# Patient Record
Sex: FEMALE | Race: WHITE | NOT HISPANIC OR LATINO | Employment: UNEMPLOYED | URBAN - METROPOLITAN AREA
[De-identification: names, ages, dates, MRNs, and addresses within clinical notes are randomized per-mention and may not be internally consistent; named-entity substitution may affect disease eponyms.]

---

## 2021-11-29 ENCOUNTER — TELEPHONE (OUTPATIENT)
Dept: FAMILY MEDICINE CLINIC | Facility: CLINIC | Age: 46
End: 2021-11-29

## 2021-11-29 ENCOUNTER — OFFICE VISIT (OUTPATIENT)
Dept: FAMILY MEDICINE CLINIC | Facility: CLINIC | Age: 46
End: 2021-11-29

## 2021-11-29 VITALS
SYSTOLIC BLOOD PRESSURE: 126 MMHG | RESPIRATION RATE: 16 BRPM | WEIGHT: 293 LBS | HEART RATE: 84 BPM | BODY MASS INDEX: 44.41 KG/M2 | DIASTOLIC BLOOD PRESSURE: 80 MMHG | HEIGHT: 68 IN | OXYGEN SATURATION: 94 %

## 2021-11-29 DIAGNOSIS — Z23 NEED FOR VACCINATION: ICD-10-CM

## 2021-11-29 DIAGNOSIS — Z12.11 SCREEN FOR COLON CANCER: Primary | ICD-10-CM

## 2021-11-29 DIAGNOSIS — Z00.00 ANNUAL PHYSICAL EXAM: ICD-10-CM

## 2021-11-29 DIAGNOSIS — Z11.59 NEED FOR HEPATITIS C SCREENING TEST: ICD-10-CM

## 2021-11-29 DIAGNOSIS — R63.5 WEIGHT GAIN: ICD-10-CM

## 2021-11-29 DIAGNOSIS — Z13.220 SCREENING FOR HYPERLIPIDEMIA: ICD-10-CM

## 2021-11-29 DIAGNOSIS — R60.0 PEDAL EDEMA: ICD-10-CM

## 2021-11-29 DIAGNOSIS — Z79.899 HIGH RISK MEDICATION USE: ICD-10-CM

## 2021-11-29 PROBLEM — J44.9 CHRONIC OBSTRUCTIVE PULMONARY DISEASE (HCC): Status: ACTIVE | Noted: 2021-11-29

## 2021-11-29 PROBLEM — I10 HYPERTENSION: Status: ACTIVE | Noted: 2021-11-29

## 2021-11-29 PROBLEM — G47.30 SLEEP APNEA: Status: ACTIVE | Noted: 2021-11-29

## 2021-11-29 PROBLEM — F43.10 POSTTRAUMATIC STRESS DISORDER: Status: ACTIVE | Noted: 2021-11-29

## 2021-11-29 PROCEDURE — 99203 OFFICE O/P NEW LOW 30 MIN: CPT | Performed by: NURSE PRACTITIONER

## 2021-11-29 PROCEDURE — 0001A COVID-19 (PFIZER): CPT | Performed by: NURSE PRACTITIONER

## 2021-11-29 PROCEDURE — 91300 COVID-19 (PFIZER): CPT | Performed by: NURSE PRACTITIONER

## 2021-11-29 RX ORDER — CARVEDILOL 6.25 MG/1
6.25 TABLET ORAL 2 TIMES DAILY WITH MEALS
COMMUNITY

## 2021-11-29 RX ORDER — HYDROCHLOROTHIAZIDE 25 MG/1
25 TABLET ORAL DAILY
COMMUNITY

## 2021-11-29 RX ORDER — FUROSEMIDE 20 MG/1
1 TABLET ORAL 2 TIMES DAILY
COMMUNITY
Start: 2021-10-20 | End: 2022-03-28

## 2021-11-29 RX ORDER — IPRATROPIUM BROMIDE AND ALBUTEROL SULFATE 2.5; .5 MG/3ML; MG/3ML
3 SOLUTION RESPIRATORY (INHALATION) EVERY 6 HOURS PRN
COMMUNITY
Start: 2021-09-18

## 2021-11-29 RX ORDER — MELOXICAM 15 MG/1
15 TABLET ORAL DAILY
COMMUNITY
End: 2022-03-28 | Stop reason: SDUPTHER

## 2021-11-29 RX ORDER — FUROSEMIDE 40 MG/1
40 TABLET ORAL 2 TIMES DAILY
COMMUNITY
End: 2021-11-29

## 2021-11-29 RX ORDER — ACETAMINOPHEN 500 MG
1 TABLET ORAL 3 TIMES DAILY PRN
COMMUNITY
Start: 2021-10-11

## 2021-11-29 RX ORDER — MAGNESIUM OXIDE 400 MG/1
1 TABLET ORAL DAILY
COMMUNITY
Start: 2021-09-07 | End: 2022-05-16

## 2021-11-29 RX ORDER — POTASSIUM CHLORIDE 750 MG/1
10 CAPSULE, EXTENDED RELEASE ORAL 2 TIMES DAILY
COMMUNITY

## 2021-11-29 RX ORDER — MONTELUKAST SODIUM 10 MG/1
1 TABLET ORAL NIGHTLY
COMMUNITY
Start: 2021-11-09

## 2021-11-29 NOTE — PROGRESS NOTES
Preventive Exam    History of Present Illness: Mirian Poon is a 46 y.o. here for check up and review of routine health maintenance. she states she is doing well and has concerns regarding pedal edema. She reports that this is an ongoing issue for about 3-4 months. Patient is taking lasix and HCTZ daily. She reports that the swelling is worse. Patient admits to shortness of breath with exertion. Her current BMI is 53.98 and she has COPD.  Patient's blood pressure medications were recently changed. Recent echocardiogram (7/2021) showed EF of 58% and mild mitral valve regurgitation.     Past medical history, surgical history and family history have been reviewed.     Review of Systems   Constitutional: Positive for fatigue and unexpected weight gain. Negative for appetite change, chills, fever and unexpected weight loss.   HENT: Negative for congestion, dental problem, postnasal drip, rhinorrhea, sinus pressure, sore throat and voice change.         Dental exam is due.    Eyes: Negative.  Negative for blurred vision, double vision and redness.        Eye exam is up to date. Wears glasses.    Respiratory: Positive for shortness of breath. Negative for cough, chest tightness and wheezing.    Cardiovascular: Positive for leg swelling. Negative for chest pain and palpitations.   Gastrointestinal: Negative for abdominal pain, constipation, diarrhea, nausea, rectal pain and vomiting.   Endocrine: Positive for polyuria. Negative for cold intolerance, heat intolerance, polydipsia and polyphagia.   Genitourinary: Positive for frequency. Negative for breast discharge, breast lump, breast pain, menstrual problem, pelvic pain, pelvic pressure, vaginal bleeding and vaginal discharge.   Musculoskeletal: Positive for arthralgias (Knees bilaterally) and back pain. Negative for joint swelling and myalgias.   Skin: Negative.    Allergic/Immunologic: Negative.  Negative for environmental allergies and food allergies.   Neurological:  Positive for numbness (Right finger numbness). Negative for dizziness, weakness and headache.   Hematological: Negative.  Does not bruise/bleed easily.   Psychiatric/Behavioral: Negative.  Positive for depressed mood. Negative for sleep disturbance and suicidal ideas. The patient is not nervous/anxious.         Patient is scheduled with psychiatrist. She denies any suicidal thoughts or planning.        PHYSICAL EXAM    Vitals:    11/29/21 1020   BP: 126/80   Pulse: 84   Resp: 16   SpO2: 94%     Body mass index is 53.98 kg/m².      Physical Exam  Vitals and nursing note reviewed.   Constitutional:       Appearance: Normal appearance. She is well-developed. She is obese.   HENT:      Head: Normocephalic and atraumatic.      Right Ear: Tympanic membrane, ear canal and external ear normal.      Left Ear: Tympanic membrane, ear canal and external ear normal.      Nose: Nose normal.      Mouth/Throat:      Lips: Pink.      Mouth: Mucous membranes are moist.      Tongue: No lesions.      Palate: No mass and lesions.      Pharynx: Oropharynx is clear. Uvula midline.      Tonsils: No tonsillar exudate.   Eyes:      Conjunctiva/sclera: Conjunctivae normal.      Pupils: Pupils are equal, round, and reactive to light.   Neck:      Thyroid: No thyromegaly.   Cardiovascular:      Rate and Rhythm: Normal rate and regular rhythm.      Pulses:           Dorsalis pedis pulses are 1+ on the right side and 1+ on the left side.        Posterior tibial pulses are 1+ on the right side and 1+ on the left side.      Heart sounds: Normal heart sounds. No murmur heard.      Pulmonary:      Effort: Pulmonary effort is normal.      Breath sounds: Normal breath sounds.   Chest:   Breasts:      Right: No supraclavicular adenopathy.      Left: No supraclavicular adenopathy.       Abdominal:      General: Bowel sounds are normal. There is no distension.      Palpations: Abdomen is soft.      Tenderness: There is no abdominal tenderness.    Musculoskeletal:         General: No deformity. Normal range of motion.      Cervical back: Normal range of motion and neck supple.      Right lower le+ Pitting Edema present.      Left lower le+ Pitting Edema present.   Lymphadenopathy:      Head:      Right side of head: No submental, submandibular, tonsillar, preauricular, posterior auricular or occipital adenopathy.      Left side of head: No submental, submandibular, tonsillar, preauricular, posterior auricular or occipital adenopathy.      Cervical: No cervical adenopathy.      Right cervical: No superficial, deep or posterior cervical adenopathy.     Left cervical: No superficial, deep or posterior cervical adenopathy.      Upper Body:      Right upper body: No supraclavicular adenopathy.      Left upper body: No supraclavicular adenopathy.   Skin:     General: Skin is warm and dry.      Capillary Refill: Capillary refill takes 2 to 3 seconds.   Neurological:      General: No focal deficit present.      Mental Status: She is alert and oriented to person, place, and time.      Cranial Nerves: Cranial nerves are intact. No cranial nerve deficit.      Sensory: Sensation is intact.      Motor: Motor function is intact.      Coordination: Coordination is intact.      Gait: Gait is intact.   Psychiatric:         Attention and Perception: Attention and perception normal.         Mood and Affect: Mood and affect normal.         Speech: Speech normal.         Behavior: Behavior normal. Behavior is cooperative.         Thought Content: Thought content normal.         Cognition and Memory: Cognition and memory normal.         Judgment: Judgment normal.         Procedures    Diagnoses and all orders for this visit:    1. Screen for colon cancer (Primary)  -     Cologuard - Stool, Per Rectum; Future    2. Annual physical exam    3. Screening for hyperlipidemia  -     Lipid Panel With / Chol / HDL Ratio    4. High risk medication use  -     CBC & Differential  -      Comprehensive Metabolic Panel    5. Need for hepatitis C screening test  -     Hepatitis C Antibody    6. Pedal edema  -     Comprehensive Metabolic Panel  -     BNP    7. Weight gain  -     TSH    8. Need for vaccination  -     COVID-19 Vaccine (Pfizer)        Problems Addressed this Visit     None      Visit Diagnoses     Screen for colon cancer    -  Primary    Relevant Orders    Cologuard - Stool, Per Rectum    Annual physical exam        Screening for hyperlipidemia        Relevant Orders    Lipid Panel With / Chol / HDL Ratio    High risk medication use        Relevant Orders    CBC & Differential    Comprehensive Metabolic Panel    Need for hepatitis C screening test        Relevant Orders    Hepatitis C Antibody    Pedal edema        Relevant Orders    Comprehensive Metabolic Panel    BNP    Weight gain        Relevant Orders    TSH    Need for vaccination        Relevant Orders    COVID-19 Vaccine (Pfizer)      Diagnoses       Codes Comments    Screen for colon cancer    -  Primary ICD-10-CM: Z12.11  ICD-9-CM: V76.51     Annual physical exam     ICD-10-CM: Z00.00  ICD-9-CM: V70.0     Screening for hyperlipidemia     ICD-10-CM: Z13.220  ICD-9-CM: V77.91     High risk medication use     ICD-10-CM: Z79.899  ICD-9-CM: V58.69     Need for hepatitis C screening test     ICD-10-CM: Z11.59  ICD-9-CM: V73.89     Pedal edema     ICD-10-CM: R60.0  ICD-9-CM: 782.3     Weight gain     ICD-10-CM: R63.5  ICD-9-CM: 783.1     Need for vaccination     ICD-10-CM: Z23  ICD-9-CM: V05.9         Encouraged weight loss  DASH Diet  CMP  CBC  Hepatitis C Screening  BNP  Covid booster  Cologuard  TSH    Patient screened positive for depression based on a PHQ-9 score of 22 on 11/29/2021. Follow-up recommendations include: patient has appointment with psychiatrist with [facility].    Routine health maintenance reviewed and discussed with Mirian Poon.    Preventative counseling regarding healthy diet and exercise.   Pt reports  that he wears a seatbelt regularly.    Return in about 6 months (around 5/29/2022) for Annual, Labs.

## 2021-11-29 NOTE — PATIENT INSTRUCTIONS
"Return in about 6 months (around 5/29/2022) for Annual, Labs.  Call with any questions or concerns.  I will call you with your lab results.   Please call with any questions or concerns.     DASH Eating Plan  DASH stands for Dietary Approaches to Stop Hypertension. The DASH eating plan is a healthy eating plan that has been shown to:  · Reduce high blood pressure (hypertension).  · Reduce your risk for type 2 diabetes, heart disease, and stroke.  · Help with weight loss.  What are tips for following this plan?  Reading food labels  · Check food labels for the amount of salt (sodium) per serving. Choose foods with less than 5 percent of the Daily Value of sodium. Generally, foods with less than 300 milligrams (mg) of sodium per serving fit into this eating plan.  · To find whole grains, look for the word \"whole\" as the first word in the ingredient list.  Shopping  · Buy products labeled as \"low-sodium\" or \"no salt added.\"  · Buy fresh foods. Avoid canned foods and pre-made or frozen meals.  Cooking  · Avoid adding salt when cooking. Use salt-free seasonings or herbs instead of table salt or sea salt. Check with your health care provider or pharmacist before using salt substitutes.  · Do not redding foods. Cook foods using healthy methods such as baking, boiling, grilling, roasting, and broiling instead.  · Cook with heart-healthy oils, such as olive, canola, avocado, soybean, or sunflower oil.  Meal planning    · Eat a balanced diet that includes:  ? 4 or more servings of fruits and 4 or more servings of vegetables each day. Try to fill one-half of your plate with fruits and vegetables.  ? 6-8 servings of whole grains each day.  ? Less than 6 oz (170 g) of lean meat, poultry, or fish each day. A 3-oz (85-g) serving of meat is about the same size as a deck of cards. One egg equals 1 oz (28 g).  ? 2-3 servings of low-fat dairy each day. One serving is 1 cup (237 mL).  ? 1 serving of nuts, seeds, or beans 5 times each " week.  ? 2-3 servings of heart-healthy fats. Healthy fats called omega-3 fatty acids are found in foods such as walnuts, flaxseeds, fortified milks, and eggs. These fats are also found in cold-water fish, such as sardines, salmon, and mackerel.  · Limit how much you eat of:  ? Canned or prepackaged foods.  ? Food that is high in trans fat, such as some fried foods.  ? Food that is high in saturated fat, such as fatty meat.  ? Desserts and other sweets, sugary drinks, and other foods with added sugar.  ? Full-fat dairy products.  · Do not salt foods before eating.  · Do not eat more than 4 egg yolks a week.  · Try to eat at least 2 vegetarian meals a week.  · Eat more home-cooked food and less restaurant, buffet, and fast food.    Lifestyle  · When eating at a restaurant, ask that your food be prepared with less salt or no salt, if possible.  · If you drink alcohol:  ? Limit how much you use to:  § 0-1 drink a day for women who are not pregnant.  § 0-2 drinks a day for men.  ? Be aware of how much alcohol is in your drink. In the [place] one drink equals one 12 oz bottle of beer (355 mL), one 5 oz glass of wine (148 mL), or one 1½ oz glass of hard liquor (44 mL).  General information  · Avoid eating more than 2,300 mg of salt a day. If you have hypertension, you may need to reduce your sodium intake to 1,500 mg a day.  · Work with your health care provider to maintain a healthy body weight or to lose weight. Ask what an ideal weight is for you.  · Get at least 30 minutes of exercise that causes your heart to beat faster (aerobic exercise) most days of the week. Activities may include walking, swimming, or biking.  · Work with your health care provider or dietitian to adjust your eating plan to your individual calorie needs.  What foods should I eat?  Fruits  All fresh, dried, or frozen fruit. Canned fruit in natural juice (without added sugar).  Vegetables  Fresh or frozen vegetables (raw, steamed, roasted, or  grilled). Low-sodium or reduced-sodium tomato and vegetable juice. Low-sodium or reduced-sodium tomato sauce and tomato paste. Low-sodium or reduced-sodium canned vegetables.  Grains  Whole-grain or whole-wheat bread. Whole-grain or whole-wheat pasta. Brown rice. Oatmeal. Quinoa. Bulgur. Whole-grain and low-sodium cereals. Concepcion bread. Low-fat, low-sodium crackers. Whole-wheat flour tortillas.  Meats and other proteins  Skinless chicken or turkey. Ground chicken or turkey. Pork with fat trimmed off. Fish and seafood. Egg whites. Dried beans, peas, or lentils. Unsalted nuts, nut butters, and seeds. Unsalted canned beans. Lean cuts of beef with fat trimmed off. Low-sodium, lean precooked or cured meat, such as sausages or meat loaves.  Dairy  Low-fat (1%) or fat-free (skim) milk. Reduced-fat, low-fat, or fat-free cheeses. Nonfat, low-sodium ricotta or cottage cheese. Low-fat or nonfat yogurt. Low-fat, low-sodium cheese.  Fats and oils  Soft margarine without trans fats. Vegetable oil. Reduced-fat, low-fat, or light mayonnaise and salad dressings (reduced-sodium). Canola, safflower, olive, avocado, soybean, and sunflower oils. Avocado.  Seasonings and condiments  Herbs. Spices. Seasoning mixes without salt.  Other foods  Unsalted popcorn and pretzels. Fat-free sweets.  The items listed above may not be a complete list of foods and beverages you can eat. Contact a dietitian for more information.  What foods should I avoid?  Fruits  Canned fruit in a light or heavy syrup. Fried fruit. Fruit in cream or butter sauce.  Vegetables  Creamed or fried vegetables. Vegetables in a cheese sauce. Regular canned vegetables (not low-sodium or reduced-sodium). Regular canned tomato sauce and paste (not low-sodium or reduced-sodium). Regular tomato and vegetable juice (not low-sodium or reduced-sodium). Pickles. Olives.  Grains  Baked goods made with fat, such as croissants, muffins, or some breads. Dry pasta or rice meal packs.  Meats  and other proteins  Fatty cuts of meat. Ribs. Fried meat. Bhatti. Bologna, salami, and other precooked or cured meats, such as sausages or meat loaves. Fat from the back of a pig (fatback). Bratwurst. Salted nuts and seeds. Canned beans with added salt. Canned or smoked fish. Whole eggs or egg yolks. Chicken or turkey with skin.  Dairy  Whole or 2% milk, cream, and half-and-half. Whole or full-fat cream cheese. Whole-fat or sweetened yogurt. Full-fat cheese. Nondairy creamers. Whipped toppings. Processed cheese and cheese spreads.  Fats and oils  Butter. Stick margarine. Lard. Shortening. Ghee. Bhatti fat. Tropical oils, such as coconut, palm kernel, or palm oil.  Seasonings and condiments  Onion salt, garlic salt, seasoned salt, table salt, and sea salt. Worcestershire sauce. Tartar sauce. Barbecue sauce. Teriyaki sauce. Soy sauce, including reduced-sodium. Steak sauce. Canned and packaged gravies. Fish sauce. Oyster sauce. Cocktail sauce. Store-bought horseradish. Ketchup. Mustard. Meat flavorings and tenderizers. Bouillon cubes. Hot sauces. Pre-made or packaged marinades. Pre-made or packaged taco seasonings. Relishes. Regular salad dressings.  Other foods  Salted popcorn and pretzels.  The items listed above may not be a complete list of foods and beverages you should avoid. Contact a dietitian for more information.  Where to find more information  · National Heart, Lung, and Blood [place]: www.nhlbi.nih.gov  · American Heart Association: www.heart.org  · Academy of Nutrition and Dietetics: www.eatright.org  · National Kidney Foundation: www.kidney.org  Summary  · The DASH eating plan is a healthy eating plan that has been shown to reduce high blood pressure (hypertension). It may also reduce your risk for type 2 diabetes, heart disease, and stroke.  · When on the DASH eating plan, aim to eat more fresh fruits and vegetables, whole grains, lean proteins, low-fat dairy, and heart-healthy fats.  · With the DASH  eating plan, you should limit salt (sodium) intake to 2,300 mg a day. If you have hypertension, you may need to reduce your sodium intake to 1,500 mg a day.  · Work with your health care provider or dietitian to adjust your eating plan to your individual calorie needs.  This information is not intended to replace advice given to you by your health care provider. Make sure you discuss any questions you have with your health care provider.  Document Revised: 2020 Document Reviewed: 2020  [employer] Patient Education ©  [employer]      Annual Wellness  Personal Prevention Plan of Service     Date of Office Visit:  2021  Encounter Provider:  BELLE Lambert  Place of Service:  [facility]  Patient Name: Mirian Poon  :  1975    As part of the Annual Wellness portion of your visit today, we are providing you with this personalized preventive plan of services (PPPS). This plan is based upon recommendations of the [place] Preventive Services Task Force (USPSTF) and the Advisory Committee on Immunization Practices (ACIP).    This lists the preventive care services that should be considered, and provides dates of when you are due. Items listed as completed are up-to-date and do not require any further intervention.    Health Maintenance   Topic Date Due   • COLORECTAL CANCER SCREENING  Never done   • Pneumococcal Vaccine 0-64 (1 of 2 - PPSV23) Never done   • TDAP/TD VACCINES (1 - Tdap) Never done   • INFLUENZA VACCINE  Never done   • HEPATITIS C SCREENING  Never done   • MAMMOGRAM  10/12/2022   • ANNUAL PHYSICAL  2022   • PAP SMEAR  10/12/2024   • COVID-19 Vaccine  Completed       Orders Placed This Encounter   Procedures   • COVID-19 Vaccine (Pfizer)   • Cologuard - Stool, Per Rectum     Standing Status:   Future     Standing Expiration Date:   2022     Order Specific Question:   Release to patient     Answer:   Immediate   •  Comprehensive Metabolic Panel     Order Specific Question:   Release to patient     Answer:   Immediate   • Lipid Panel With / Chol / HDL Ratio     Order Specific Question:   Release to patient     Answer:   Immediate   • Hepatitis C Antibody     Order Specific Question:   Release to patient     Answer:   Immediate   • TSH     Order Specific Question:   Release to patient     Answer:   Immediate   • BNP     Order Specific Question:   Release to patient     Answer:   Immediate   • CBC & Differential       Return in about 6 months (around 5/29/2022) for Annual, Labs.

## 2021-11-29 NOTE — PROGRESS NOTES
Subjective   Mirian Poon is a 46 y.o. female.     History of Present Illness     The following portions of the patient's history were reviewed and updated as appropriate: allergies, current medications, past family history, past medical history, past social history, past surgical history and problem list.    Review of Systems    Objective   Physical Exam    Vitals:    11/29/21 1020   BP: 126/80   Pulse: 84   Resp: 16   SpO2: 94%     There is no height or weight on file to calculate BMI.    Procedures    Assessment/Plan   Problems Addressed this Visit     None      Diagnoses    None.                  No follow-ups on file.

## 2021-11-30 LAB
ALBUMIN SERPL-MCNC: 3.8 G/DL (ref 3.8–4.8)
ALBUMIN/GLOB SERPL: 1.3 {RATIO} (ref 1.2–2.2)
ALP SERPL-CCNC: 89 IU/L (ref 44–121)
ALT SERPL-CCNC: 24 IU/L (ref 0–32)
AST SERPL-CCNC: 20 IU/L (ref 0–40)
BASOPHILS # BLD AUTO: 0.1 X10E3/UL (ref 0–0.2)
BASOPHILS NFR BLD AUTO: 1 %
BILIRUB SERPL-MCNC: 0.2 MG/DL (ref 0–1.2)
BNP SERPL-MCNC: 29.1 PG/ML (ref 0–100)
BUN SERPL-MCNC: 14 MG/DL (ref 6–24)
BUN/CREAT SERPL: 14 (ref 9–23)
CALCIUM SERPL-MCNC: 9.2 MG/DL (ref 8.7–10.2)
CHLORIDE SERPL-SCNC: 100 MMOL/L (ref 96–106)
CHOLEST SERPL-MCNC: 219 MG/DL (ref 100–199)
CHOLEST/HDLC SERPL: 3.6 RATIO (ref 0–4.4)
CO2 SERPL-SCNC: 24 MMOL/L (ref 20–29)
CREAT SERPL-MCNC: 0.99 MG/DL (ref 0.57–1)
EOSINOPHIL # BLD AUTO: 0.4 X10E3/UL (ref 0–0.4)
EOSINOPHIL NFR BLD AUTO: 6 %
ERYTHROCYTE [DISTWIDTH] IN BLOOD BY AUTOMATED COUNT: 13.6 % (ref 11.7–15.4)
GLOBULIN SER CALC-MCNC: 2.9 G/DL (ref 1.5–4.5)
GLUCOSE SERPL-MCNC: 91 MG/DL (ref 65–99)
HCT VFR BLD AUTO: 41.1 % (ref 34–46.6)
HCV AB S/CO SERPL IA: <0.1 S/CO RATIO (ref 0–0.9)
HDLC SERPL-MCNC: 61 MG/DL
HGB BLD-MCNC: 14.1 G/DL (ref 11.1–15.9)
IMM GRANULOCYTES # BLD AUTO: 0 X10E3/UL (ref 0–0.1)
IMM GRANULOCYTES NFR BLD AUTO: 0 %
LDLC SERPL CALC-MCNC: 133 MG/DL (ref 0–99)
LYMPHOCYTES # BLD AUTO: 2.1 X10E3/UL (ref 0.7–3.1)
LYMPHOCYTES NFR BLD AUTO: 31 %
MCH RBC QN AUTO: 32 PG (ref 26.6–33)
MCHC RBC AUTO-ENTMCNC: 34.3 G/DL (ref 31.5–35.7)
MCV RBC AUTO: 93 FL (ref 79–97)
MONOCYTES # BLD AUTO: 0.6 X10E3/UL (ref 0.1–0.9)
MONOCYTES NFR BLD AUTO: 9 %
NEUTROPHILS # BLD AUTO: 3.6 X10E3/UL (ref 1.4–7)
NEUTROPHILS NFR BLD AUTO: 53 %
PLATELET # BLD AUTO: 300 X10E3/UL (ref 150–450)
POTASSIUM SERPL-SCNC: 3.5 MMOL/L (ref 3.5–5.2)
PROT SERPL-MCNC: 6.7 G/DL (ref 6–8.5)
RBC # BLD AUTO: 4.41 X10E6/UL (ref 3.77–5.28)
SODIUM SERPL-SCNC: 139 MMOL/L (ref 134–144)
TRIGL SERPL-MCNC: 142 MG/DL (ref 0–149)
TSH SERPL DL<=0.005 MIU/L-ACNC: 2.63 UIU/ML (ref 0.45–4.5)
VLDLC SERPL CALC-MCNC: 25 MG/DL (ref 5–40)
WBC # BLD AUTO: 6.8 X10E3/UL (ref 3.4–10.8)

## 2022-03-28 ENCOUNTER — OFFICE VISIT (OUTPATIENT)
Dept: FAMILY MEDICINE CLINIC | Facility: CLINIC | Age: 47
End: 2022-03-28

## 2022-03-28 VITALS
SYSTOLIC BLOOD PRESSURE: 110 MMHG | WEIGHT: 293 LBS | HEART RATE: 51 BPM | DIASTOLIC BLOOD PRESSURE: 68 MMHG | HEIGHT: 68 IN | OXYGEN SATURATION: 98 % | BODY MASS INDEX: 44.41 KG/M2

## 2022-03-28 DIAGNOSIS — G89.29 CHRONIC BILATERAL LOW BACK PAIN WITH BILATERAL SCIATICA: Primary | ICD-10-CM

## 2022-03-28 DIAGNOSIS — M54.42 CHRONIC BILATERAL LOW BACK PAIN WITH BILATERAL SCIATICA: Primary | ICD-10-CM

## 2022-03-28 DIAGNOSIS — E66.01 MORBID (SEVERE) OBESITY DUE TO EXCESS CALORIES: ICD-10-CM

## 2022-03-28 DIAGNOSIS — M54.41 CHRONIC BILATERAL LOW BACK PAIN WITH BILATERAL SCIATICA: Primary | ICD-10-CM

## 2022-03-28 PROCEDURE — 99213 OFFICE O/P EST LOW 20 MIN: CPT | Performed by: NURSE PRACTITIONER

## 2022-03-28 RX ORDER — ESCITALOPRAM OXALATE 10 MG/1
TABLET ORAL
COMMUNITY
Start: 2022-03-27 | End: 2022-05-16

## 2022-03-28 RX ORDER — MELOXICAM 15 MG/1
15 TABLET ORAL DAILY
Qty: 30 TABLET | Refills: 3 | Status: SHIPPED | OUTPATIENT
Start: 2022-03-28 | End: 2022-10-19

## 2022-03-28 RX ORDER — PREDNISONE 10 MG/1
TABLET ORAL
COMMUNITY
Start: 2022-03-26 | End: 2022-05-16

## 2022-03-28 RX ORDER — FUROSEMIDE 40 MG/1
TABLET ORAL
COMMUNITY
Start: 2022-02-24

## 2022-03-28 NOTE — PROGRESS NOTES
Subjective   Mirian Poon is a 47 y.o. female.     History of Present Illness   Patient presents with c/o right and left leg pain, ongoing. She reports that she does have some sciatica on both sides. She reports that the right leg is worse. Patient reports that she had recent CT of chest, on 2/24/2022 at [facility],  that incidentally showed degenerative disc changes. Patient does report low back pain as well.  I reviewed on her phone as it is not in Epic. She has not been to physical therapy for her pain.  Patient's current BMI is 52.15.  I discussed with her that her weight does contribute to her discomfort.       The following portions of the patient's history were reviewed and updated as appropriate: allergies, current medications, past family history, past medical history, past social history, past surgical history and problem list.    Review of Systems   Constitutional: Negative for chills, fatigue and fever.   Respiratory: Negative for cough, chest tightness, shortness of breath and wheezing.    Cardiovascular: Negative for chest pain, palpitations and leg swelling.   Musculoskeletal: Positive for back pain. Negative for arthralgias, gait problem, joint swelling, myalgias, neck pain and neck stiffness.   Skin: Negative for dry skin.   Neurological: Negative for dizziness, weakness and headache.   Psychiatric/Behavioral: Negative.        Objective   Physical Exam  Vitals and nursing note reviewed.   Constitutional:       Appearance: Normal appearance. She is well-developed. She is obese.   HENT:      Head: Normocephalic and atraumatic.   Eyes:      Conjunctiva/sclera: Conjunctivae normal.      Pupils: Pupils are equal, round, and reactive to light.   Neck:      Thyroid: No thyromegaly.   Cardiovascular:      Rate and Rhythm: Normal rate and regular rhythm.      Heart sounds: Normal heart sounds. No murmur heard.  Pulmonary:      Effort: Pulmonary effort is normal.      Breath sounds: Normal breath sounds.  "  Musculoskeletal:         General: No deformity. Normal range of motion.      Cervical back: Normal, full passive range of motion without pain, normal range of motion and neck supple.      Thoracic back: Normal.      Lumbar back: Normal. No swelling, edema, tenderness or bony tenderness. Normal range of motion. Negative right straight leg raise test and negative left straight leg raise test.      Comments: Patient reports pain in her \"tailbone\" when raising her right leg.    Lymphadenopathy:      Cervical: No cervical adenopathy.   Skin:     General: Skin is warm and dry.   Neurological:      Mental Status: She is alert and oriented to person, place, and time.   Psychiatric:         Behavior: Behavior normal.         Thought Content: Thought content normal.         Judgment: Judgment normal.         Vitals:    03/28/22 1010   BP: 110/68   Pulse: 51   SpO2: 98%     Body mass index is 52.15 kg/m².    Procedures    Assessment/Plan   Problems Addressed this Visit    None     Visit Diagnoses     Chronic bilateral low back pain with bilateral sciatica    -  Primary    Relevant Medications    meloxicam (MOBIC) 15 MG tablet    Other Relevant Orders    Ambulatory Referral to Physical Therapy Evaluate and treat    Morbid (severe) obesity due to excess calories (HCC)        Relevant Orders    Ambulatory Referral to Physical Therapy Evaluate and treat      Diagnoses       Codes Comments    Chronic bilateral low back pain with bilateral sciatica    -  Primary ICD-10-CM: M54.42, M54.41, G89.29  ICD-9-CM: 724.2, 724.3, 338.29     Morbid (severe) obesity due to excess calories (HCC)     ICD-10-CM: E66.01  ICD-9-CM: 278.01         Meloxicam 15 mg 1 p.o. QD  Physical therapy referral   Weight loss encouraged         Return if symptoms worsen or fail to improve.  "

## 2022-03-28 NOTE — PATIENT INSTRUCTIONS
Return if symptoms worsen or fail to improve.  Call with any questions or concerns.     Chronic Back Pain  When back pain lasts longer than 3 months, it is called chronic back pain. The cause of your back pain may not be known. Some common causes include:  Wear and tear (degenerative disease) of the bones, ligaments, or disks in your back.  Inflammation and stiffness in your back (arthritis).  People who have chronic back pain often go through certain periods in which the pain is more intense (flare-ups). Many people can learn to manage the pain with home care.  Follow these instructions at home:  Pay attention to any changes in your symptoms. Take these actions to help with your pain:  Managing pain and stiffness         If directed, apply ice to the painful area. Your health care provider may recommend applying ice during the first 24-48 hours after a flare-up begins. To do this:  Put ice in a plastic bag.  Place a towel between your skin and the bag.  Leave the ice on for 20 minutes, 2-3 times per day.  If directed, apply heat to the affected area as often as told by your health care provider. Use the heat source that your health care provider recommends, such as a moist heat pack or a heating pad.  Place a towel between your skin and the heat source.  Leave the heat on for 20-30 minutes.  Remove the heat if your skin turns bright red. This is especially important if you are unable to feel pain, heat, or cold. You may have a greater risk of getting burned.  Try soaking in a warm tub.  Activity    Avoid bending and other activities that make the problem worse.  Maintain a proper position when standing or sitting:  When standing, keep your upper back and neck straight, with your shoulders pulled back. Avoid slouching.  When sitting, keep your back straight and relax your shoulders. Do not round your shoulders or pull them backward.  Do not sit or  one place for long periods of time.  Take brief periods of  rest throughout the day. This will reduce your pain. Resting in a lying or standing position is usually better than sitting to rest.  When you are resting for longer periods, mix in some mild activity or stretching between periods of rest. This will help to prevent stiffness and pain.  Get regular exercise. Ask your health care provider what activities are safe for you.  Do not lift anything that is heavier than 10 lb (4.5 kg), or the limit that you are told, until your health care provider says that it is safe. Always use proper lifting technique, which includes:  Bending your knees.  Keeping the load close to your body.  Avoiding twisting.  Sleep on a firm mattress in a comfortable position. Try lying on your side with your knees slightly bent. If you lie on your back, put a pillow under your knees.    Medicines  Treatment may include medicines for pain and inflammation taken by mouth or applied to the skin, prescription pain medicine, or muscle relaxants. Take over-the-counter and prescription medicines only as told by your health care provider.  Ask your health care provider if the medicine prescribed to you:  Requires you to avoid driving or using machinery.  Can cause constipation. You may need to take these actions to prevent or treat constipation:  Drink enough fluid to keep your urine pale yellow.  Take over-the-counter or prescription medicines.  Eat foods that are high in fiber, such as beans, whole grains, and fresh fruits and vegetables.  Limit foods that are high in fat and processed sugars, such as fried or sweet foods.  General instructions  Do not use any products that contain nicotine or tobacco, such as cigarettes, e-cigarettes, and chewing tobacco. If you need help quitting, ask your health care provider.  Keep all follow-up visits as told by your health care provider. This is important.  Contact a health care provider if:  You have pain that is not relieved with rest or medicine.  Your pain gets  worse, or you have new pain.  You have a high fever.  You have rapid weight loss.  You have trouble doing your normal activities.  Get help right away if:  You have weakness or numbness in one or both of your legs or feet.  You have trouble controlling your bladder or your bowels.  You have severe back pain and have any of the following:  Nausea or vomiting.  Pain in your abdomen.  Shortness of breath or you faint.  Summary  Chronic back pain is back pain that lasts longer than 3 months.  When a flare-up begins, apply ice to the painful area for the first 24-48 hours.  Apply a moist heat pad or use a heating pad on the painful area as directed by your health care provider.  When you are resting for longer periods, mix in some mild activity or stretching between periods of rest. This will help to prevent stiffness and pain.  This information is not intended to replace advice given to you by your health care provider. Make sure you discuss any questions you have with your health care provider.  Document Revised: 01/27/2021 Document Reviewed: 01/27/2021  [employer] Patient Education © 2021 [employer]

## 2022-04-18 ENCOUNTER — HOSPITAL ENCOUNTER (OUTPATIENT)
Dept: PHYSICAL THERAPY | Facility: HOSPITAL | Age: 47
Setting detail: THERAPIES SERIES
Discharge: HOME | End: 2022-04-18

## 2022-04-18 DIAGNOSIS — G89.29 CHRONIC BILATERAL LOW BACK PAIN WITH BILATERAL SCIATICA: Primary | ICD-10-CM

## 2022-04-18 DIAGNOSIS — M53.3 SACRAL BACK PAIN: ICD-10-CM

## 2022-04-18 DIAGNOSIS — M54.42 CHRONIC BILATERAL LOW BACK PAIN WITH BILATERAL SCIATICA: Primary | ICD-10-CM

## 2022-04-18 DIAGNOSIS — M79.604 LEG PAIN, BILATERAL: ICD-10-CM

## 2022-04-18 DIAGNOSIS — M79.605 LEG PAIN, BILATERAL: ICD-10-CM

## 2022-04-18 DIAGNOSIS — M54.41 CHRONIC BILATERAL LOW BACK PAIN WITH BILATERAL SCIATICA: Primary | ICD-10-CM

## 2022-04-18 PROCEDURE — 97110 THERAPEUTIC EXERCISES: CPT | Performed by: PHYSICAL THERAPIST

## 2022-04-18 PROCEDURE — 97162 PT EVAL MOD COMPLEX 30 MIN: CPT | Performed by: PHYSICAL THERAPIST

## 2022-04-18 NOTE — THERAPY EVALUATION
Outpatient Physical Therapy Ortho Initial Evaluation  [facility]     Patient Name: Mirian Poon  : 1975  MRN: [id number]  Today's Date: 2022      Visit Date: 2022    Patient Active Problem List   Diagnosis   • Chronic obstructive pulmonary disease (HCC)   • Hypertension   • Impingement syndrome of right shoulder   • Posttraumatic stress disorder   • Rotator cuff tear   • Sleep apnea        Past Medical History:   Diagnosis Date   • Allergic    • Anxiety    • Asthma    • COPD (chronic obstructive pulmonary disease) (HCC)    • Depression    • Hypertension    • Low back pain    • Obesity    • Sleep apnea    • Substance abuse (HCC)     in her teens        Past Surgical History:   Procedure Laterality Date   • BREAST LUMPECTOMY     •  SECTION     • SHOULDER LIGAMENT REPAIR         Visit Dx:     ICD-10-CM ICD-9-CM   1. Chronic bilateral low back pain with bilateral sciatica  M54.42 724.2    M54.41 724.3    G89.29 338.29   2. Leg pain, bilateral  M79.604 729.5    M79.605    3. Sacral back pain  M53.3 724.6          Patient History     Row Name 22 1000             History    Chief Complaint Pain  -GR      Type of Pain Back pain;Lower Extremity / Leg  -GR      Date Current Problem(s) Began 22  -GR      Brief Description of Current Complaint Reports worsening of leg pain over the past year. She states she has had several hospitalizations for difficulty breathing; she has been on and off steroids which has caused her weight to fluctuate. She did quit smoking last year after being diagnosed with COPD. She is followed by a pulmonologist.  She does have an inhaler that she uses intermittently. She states sitting bothers her tailbone and she has to lean forward to avoid putting pressure on her sacral area. She does not like to sit all day.  She requires assist from her family to do daily activities. This has been 6 months to a year. She has extreme PTSD and dissossiactive disorder;  "is followed by a psychiatrist.  She associates low back pain with household chores. She does have sensitivity to touch on her thighs and c/o equal pain down both legs into her feet. She occasionlly gets injections in her feet for pain; she has been \"pigeon toed\" since childhood. She denies paresthesias, but more of a dull pain in the legs. She takes meloxicam and tylenol for pain control. She has previous PT experience for a R RC repair several years ago with success. Awaiting angiogram with cardiology. Has family support to help her keep track of appointments becasue she frequently forgets.  -GR      Patient/Caregiver Goals Know what to do to help the symptoms;Relieve pain  -GR      Occupation/sports/leisure activities Crotcheing; she states she used to like to do outdoor activities  -GR              Pain     Pain Location Leg  -GR      Pain at Present 6  -GR      Pain at Best 6  -GR      Pain at Worst 8  -GR      Is your sleep disturbed? Yes  -GR      Is medication used to assist with sleep? No  -GR              Fall Risk Assessment    Any falls in the past year: No  -GR              Services    Do you plan to receive Home Health services in the near future No  -GR              Daily Activities    Primary Language English  -GR      Pt Participated in POC and Goals Yes  -GR              Safety    Are you being hurt, hit, or frightened by anyone at home or in your life? No  -GR      Are you being neglected by a caregiver No  -GR      Have you had any of the following issues with Anxiety;Depression  -GR            User Key  (r) = Recorded By, (t) = Taken By, (c) = Cosigned By    Initials Name Provider Type    GR Missael Deshpande, PT Physical Therapist                 PT Ortho     Row Name 04/18/22 1000       Quarter Clearing    Quarter Clearing Lower Quarter Clearing  -GR       Neural Tension Signs- Lower Quarter Clearing    SLR Bilateral:;Positive  -GR       Myotomal Screen- Lower Quarter Clearing    Hip flexion " (L2) Bilateral:;4- (Good -)  -GR    Knee extension (L3) Bilateral:;4- (Good -)  -GR    Ankle DF (L4) Bilateral:;4- (Good -)  -GR    Ankle PF (S1) Bilateral:;4- (Good -)  -GR    Knee flexion (S2) Bilateral:;3+ (Fair +)  -GR       Lumbar ROM Screen- Lower Quarter Clearing    Lumbar Flexion Impaired  to distal tibia bilaterally  -GR    Lumbar Extension Impaired  50% impaired with pain  -GR    Lumbar Lateral Flexion Impaired  25% impaired B with pain in tailbone upon L lateral flexion  -GR    Lumbar Rotation Impaired  25%  impaired bilaterally  -GR       SI/Hip Screen- Lower Quarter Clearing    ASIS compression Positive  -GR    Emily's/Car's test Bilateral:;Positive  -GR       MMT (Manual Muscle Testing)    Rt Lower Ext Rt Hip Extension;Rt Hip ABduction  -GR    Lt Lower Ext Lt Hip Extension;Lt Hip ABduction  -GR       MMT Right Lower Ext    Rt Hip Extension MMT, Gross Movement (3/5) fair  -GR    Rt Hip ABduction MMT, Gross Movement (3/5) fair  -GR       MMT Left Lower Ext    Lt Hip Extension MMT, Gross Movement (3/5) fair  -GR    Lt Hip ABduction MMT, Gross Movement (3/5) fair  -GR       Sensation    Light Touch No apparent deficits  -GR       Gait/Stairs (Locomotion)    Comment, (Gait/Stairs) slow kavon, decreased knee flexion, minimal arm swing  -GR          User Key  (r) = Recorded By, (t) = Taken By, (c) = Cosigned By    Initials Name Provider Type    GR Missael Deshpande, PT Physical Therapist                            Therapy Education  Education Details: [id number] HEP, POC, expectations      PT OP Goals     Row Name 04/18/22 1000          PT Short Term Goals    STG Date to Achieve 05/18/22  -GR     STG 1 Patient will be independent with initail HEP.  -GR     STG 1 Progress New  -GR     STG 2 Patient will modify sitting position to reduce strain on tailbone and allow optimal healing  -GR     STG 2 Progress New  -GR     STG 2 Progress Comments educated on towel under mid femur position on eval date  -GR      STG 3 Leg pain reported as intermittent vs constant to allow for improved daily functioning.  -GR     STG 3 Progress New  -GR            Long Term Goals    LTG Date to Achieve 06/17/22  -GR     LTG 1 Patient will be independent [facility] progressive HEP for long term condition management.  -GR     LTG 1 Progress New  -GR     LTG 2 Patient will resume household chores independently with pain <4/10.  -GR     LTG 2 Progress New  -GR     LTG 3 Patient will score </= 66% disability on the modified SY to indicate improved perceived ADL performance.  -GR     LTG 3 Progress New  -GR     LTG 4 Patient resume sit<>stand with minimal UE assist and pain </=3/10.  -GR     LTG 4 Progress New  -GR            Time Calculation    PT Goal Re-Cert Due Date 07/17/22  -           User Key  (r) = Recorded By, (t) = Taken By, (c) = Cosigned By    Initials Name Provider Type    GR Missael Deshpande, PT Physical Therapist                 PT Assessment/Plan     Row Name 04/18/22 1100          PT Assessment    Functional Limitations Impaired gait;Decreased safety during functional activities;Limitation in home management;Limitations in community activities;Limitations in functional capacity and performance;Performance in leisure activities;Performance in self-care ADL  -     Impairments Gait;Endurance;Edema;Pain;Muscle strength;Poor body mechanics;Posture;Range of motion;Peripheral nerve integrity  -     Assessment Comments 48 y/o F referred to outpatient PT for evaluation of back pain, specific to the sacrum and BLE pain.  Central pain is intermittent, peripheral pain is constant. She has a mild flexion bias, notable obesity with imaging suggestive of DDD taken at [facility].  Her gait kavon is slow with decreased knee flexion, minimal arm swing and pain avoidance patterns.  Patient reports she is only likely able to attend every other week due to other medical appointments including but not limted to pulmonody for COPD, cardiology,  psychiatric with h/o PTSD and dissociative disorder. Recommend skilled PT to help address her functional deficits in alignment with this evolving condition. Thank you for this referral.  -GR     Please refer to paper survey for additional self-reported information No  -GR     Rehab Potential Fair  -GR     Patient/caregiver participated in establishment of treatment plan and goals Yes  -GR     Patient would benefit from skilled therapy intervention Yes  -GR            PT Plan    PT Frequency Other (comment)  patient requesting 1x every other week  -GR     Predicted Duration of Therapy Intervention (PT) 8 visits  -GR     Planned CPT's? PT EVAL MOD COMPLELITY: 39233;PT RE-EVAL: 11025;PT THER PROC EA 15 MIN: 89523;PT THER ACT EA 15 MIN: 84263;PT MANUAL THERAPY EA 15 MIN: 67411;PT NEUROMUSC RE-EDUCATION EA 15 MIN: 61067;PT GAIT TRAINING EA 15 MIN: 44245;PT SELF CARE/HOME MGMT/TRAIN EA 15: 35934;PT HOT OR COLD PACK TREAT MCARE;PT ELECTRICAL STIM UNATTEND: ;PT ELECTRICAL STIM ATTD EA 15 MIN: 48775  -GR     Physical Therapy Interventions (Optional Details) balance training;gait training;home exercise program;lumbar stabilization;manual therapy techniques;modalities;neuromuscular re-education;patient/family education;postural re-education;ROM (Range of Motion);strengthening;stretching  -GR     PT Plan Comments Begin with nustep. Review/progress HEP. Consider s/l strengthening and muscle energy techniques pending her sacral pain.  -GR           User Key  (r) = Recorded By, (t) = Taken By, (c) = Cosigned By    Initials Name Provider Type    GR Missael Deshpande, PT Physical Therapist                   OP Exercises     Row Name 04/18/22 1120             Total Minutes    97374 - PT Therapeutic Exercise Minutes 11  -GR              Exercise 1    Exercise Name 1 PPT  -GR      Cueing 1 Demo  -GR      Sets 1 1  -GR      Reps 1 10  -GR      Time 1 5 sec  -GR              Exercise 2    Exercise Name 2 SKTC with towel assist  -GR       Cueing 2 Demo  -GR      Sets 2 1  -GR      Reps 2 3  -GR      Time 2 20 seconds  -GR      Additional Comments each leg  -GR              Exercise 3    Exercise Name 3 LTR tolerable range  -GR      Cueing 3 Demo  -GR      Sets 3 1  -GR      Reps 3 10  -GR              Exercise 4    Exercise Name 4 Seated modification - towel under mid femur to offload tailbone  -GR            User Key  (r) = Recorded By, (t) = Taken By, (c) = Cosigned By    Initials Name Provider Type    GR Missael Deshpande, PT Physical Therapist                              Outcome Measure Options: Modified Oswestry (76% disability)         Time Calculation:     Start Time: 1010  Stop Time: 1050  Time Calculation (min): 40 min  Total Timed Code Minutes- PT: 11 minute(s)  Timed Charges  10447 - PT Therapeutic Exercise Minutes: 11  Untimed Charges  PT Eval/Re-eval Minutes: 29  Total Minutes  Timed Charges Total Minutes: 11  Untimed Charges Total Minutes: 29   Total Minutes: 40     Therapy Charges for Today     Code Description Service Date Service Provider Modifiers Qty    36514589233 HC PT THER PROC EA 15 MIN 4/18/2022 Missael Deshpande, PT GP 1    33262974877 HC PT EVAL MOD COMPLEXITY 2 4/18/2022 Missael Deshpande, PT GP 1          PT G-Codes  Outcome Measure Options: Modified Oswestry (76% disability)         Missael Deshpande, PT  4/18/2022

## 2022-05-05 ENCOUNTER — APPOINTMENT (OUTPATIENT)
Dept: PHYSICAL THERAPY | Facility: HOSPITAL | Age: 47
End: 2022-05-05

## 2022-05-16 ENCOUNTER — OFFICE VISIT (OUTPATIENT)
Dept: FAMILY MEDICINE CLINIC | Facility: CLINIC | Age: 47
End: 2022-05-16

## 2022-05-16 VITALS
OXYGEN SATURATION: 97 % | BODY MASS INDEX: 44.41 KG/M2 | HEART RATE: 78 BPM | DIASTOLIC BLOOD PRESSURE: 78 MMHG | HEIGHT: 68 IN | WEIGHT: 293 LBS | SYSTOLIC BLOOD PRESSURE: 118 MMHG

## 2022-05-16 DIAGNOSIS — M79.605 LEG PAIN, BILATERAL: Primary | ICD-10-CM

## 2022-05-16 DIAGNOSIS — M79.604 LEG PAIN, BILATERAL: Primary | ICD-10-CM

## 2022-05-16 PROCEDURE — 99213 OFFICE O/P EST LOW 20 MIN: CPT | Performed by: NURSE PRACTITIONER

## 2022-05-16 NOTE — PROGRESS NOTES
Subjective   Mirian Poon is a 47 y.o. female.     History of Present Illness   Patient presents with c/o leg pain bilaterally, ongoing. She reports that the pain is worse in her right leg. She states that the pain is from her hips down the front of her legs and radiates behind her knees bilaterally.  She reports that it hurts to walk or stand and even for her cat to walk across her legs. She reports that any movement hurts. She states that she has had to get out her cane for ambulation. She reports that the back of her knees hurt, as do her thighs. She is taking meloxicam daily and was just started on Cymbalta two weeks ago.  Her current BMI is 53.07.     The following portions of the patient's history were reviewed and updated as appropriate: allergies, current medications, past family history, past medical history, past social history, past surgical history and problem list.    Review of Systems   Constitutional: Negative for chills, fatigue and fever.   Respiratory: Negative for cough, chest tightness, shortness of breath and wheezing.    Cardiovascular: Negative for chest pain, palpitations and leg swelling.   Musculoskeletal: Positive for arthralgias (legs bilaterally).   Neurological: Negative for dizziness and headache.   Hematological: Negative.    Psychiatric/Behavioral: Negative.  Negative for sleep disturbance.       Objective   Physical Exam  Vitals and nursing note reviewed.   Constitutional:       Appearance: Normal appearance. She is well-developed. She is obese.   HENT:      Head: Normocephalic and atraumatic.   Eyes:      Conjunctiva/sclera: Conjunctivae normal.      Pupils: Pupils are equal, round, and reactive to light.   Neck:      Thyroid: No thyromegaly.   Cardiovascular:      Rate and Rhythm: Normal rate and regular rhythm.      Heart sounds: Normal heart sounds. No murmur heard.  Pulmonary:      Effort: Pulmonary effort is normal.      Breath sounds: Normal breath sounds.    Musculoskeletal:         General: No deformity.      Cervical back: Full passive range of motion without pain, normal range of motion and neck supple.      Thoracic back: Normal.      Lumbar back: Normal.      Right upper leg: Normal.      Left upper leg: Normal.      Right knee: No erythema or crepitus. Decreased range of motion. Tenderness present.      Left knee: No erythema or crepitus. Decreased range of motion. Tenderness present.      Right lower leg: No swelling or tenderness. No edema.      Left lower leg: No swelling or tenderness. No edema.      Comments: Tenderness to posterior knees with extension.    Lymphadenopathy:      Cervical: No cervical adenopathy.   Skin:     General: Skin is warm and dry.   Neurological:      Mental Status: She is alert and oriented to person, place, and time.   Psychiatric:         Behavior: Behavior normal.         Thought Content: Thought content normal.         Judgment: Judgment normal.         Vitals:    05/16/22 1546   BP: 118/78   Pulse: 78   SpO2: 97%     Body mass index is 53.07 kg/m².    Procedures    Assessment & Plan   Problems Addressed this Visit    None     Visit Diagnoses     Leg pain, bilateral    -  Primary    Relevant Orders    Ambulatory Referral to Physical Therapy Evaluate and treat      Diagnoses       Codes Comments    Leg pain, bilateral    -  Primary ICD-10-CM: M79.604, M79.605  ICD-9-CM: 729.5       Encouraged weight loss  Referral to Physical therapy   Continue meloxicam 15mg 1p.o. QD  Continue cymbalta 30mg 1p.o. QD         Return if symptoms worsen or fail to improve.

## 2022-05-18 ENCOUNTER — HOSPITAL ENCOUNTER (OUTPATIENT)
Dept: PHYSICAL THERAPY | Facility: HOSPITAL | Age: 47
Setting detail: THERAPIES SERIES
Discharge: HOME | End: 2022-05-18

## 2022-05-18 DIAGNOSIS — M79.604 LEG PAIN, BILATERAL: ICD-10-CM

## 2022-05-18 DIAGNOSIS — G89.29 CHRONIC BILATERAL LOW BACK PAIN WITH BILATERAL SCIATICA: Primary | ICD-10-CM

## 2022-05-18 DIAGNOSIS — M54.41 CHRONIC BILATERAL LOW BACK PAIN WITH BILATERAL SCIATICA: Primary | ICD-10-CM

## 2022-05-18 DIAGNOSIS — M79.605 LEG PAIN, BILATERAL: ICD-10-CM

## 2022-05-18 DIAGNOSIS — M54.42 CHRONIC BILATERAL LOW BACK PAIN WITH BILATERAL SCIATICA: Primary | ICD-10-CM

## 2022-05-18 DIAGNOSIS — M53.3 SACRAL BACK PAIN: ICD-10-CM

## 2022-05-18 PROCEDURE — 97110 THERAPEUTIC EXERCISES: CPT | Performed by: PHYSICAL THERAPIST

## 2022-05-18 NOTE — THERAPY TREATMENT NOTE
Outpatient Physical Therapy Ortho Treatment Note  [facility]     Patient Name: Mirian Poon  : 1975  MRN: [id number]  Today's Date: 2022      Visit Date: 2022    Visit Dx:    ICD-10-CM ICD-9-CM   1. Chronic bilateral low back pain with bilateral sciatica  M54.42 724.2    M54.41 724.3    G89.29 338.29   2. Leg pain, bilateral  M79.604 729.5    M79.605    3. Sacral back pain  M53.3 724.6       Patient Active Problem List   Diagnosis   • Chronic obstructive pulmonary disease (HCC)   • Hypertension   • Impingement syndrome of right shoulder   • Posttraumatic stress disorder   • Rotator cuff tear   • Sleep apnea        Past Medical History:   Diagnosis Date   • Allergic    • Anxiety    • Asthma    • COPD (chronic obstructive pulmonary disease) ([facility])    • Depression    • Hypertension    • Low back pain    • Obesity    • Sleep apnea    • Substance abuse ([facility])     in her teens        Past Surgical History:   Procedure Laterality Date   • BREAST LUMPECTOMY     •  SECTION     • SHOULDER LIGAMENT REPAIR                          PT Assessment/Plan     Row Name 22 1100          PT Assessment    Assessment Comments Patient returns for 1st follow up 1 month after PT evaluation.  Her leg pain has worsened; she has complied to her HEP partially, did require somecorrection/modification. She has met 1/3 STGs and 0/4 LTGs. Progress is limited by number of completed visits and pain. Will continue trial of PT as able - patient requesting 1x every other week per limited availability.  -GR            PT Plan    PT Plan Comments Assess response to progressions.  -GR           User Key  (r) = Recorded By, (t) = Taken By, (c) = Cosigned By    Initials Name Provider Type    Missael Castaneda, PT Physical Therapist                   OP Exercises     Row Name 22 1000             Subjective Comments    Subjective Comments Reports has gotten worse over the month. Legs hurt worse - MD gave her  another referral for PT, was hoping she might get an MRI. Is even using a can some because she feels her legs are unstable.  -GR              Subjective Pain    Able to rate subjective pain? yes  -GR      Pre-Treatment Pain Level 7  -GR              Total Minutes    92157 - PT Therapeutic Exercise Minutes 40  -GR              Exercise 1    Exercise Name 1 PPT  -GR      Cueing 1 Demo  -GR      Sets 1 1  -GR      Reps 1 15  -GR      Time 1 5 sec  -GR      Additional Comments tolerable range  -GR              Exercise 2    Exercise Name 2 SKTC with towel assist  -GR      Cueing 2 Demo  -GR      Sets 2 1  -GR      Reps 2 3  -GR      Time 2 20 seconds  -GR      Additional Comments each leg  -GR              Exercise 3    Exercise Name 3 LTR tolerable range  -GR      Cueing 3 Demo  -GR      Sets 3 1  -GR      Reps 3 15  -GR              Exercise 4    Exercise Name 4 Nustep UE/LE  -GR      Time 4 5 min  -GR      Additional Comments L2  -GR              Exercise 5    Exercise Name 5 H/L hip adduction  -GR      Cueing 5 Demo  -GR      Sets 5 1  -GR      Reps 5 15  -GR      Time 5 5 sec  -GR      Additional Comments smal blue ball  -GR              Exercise 6    Exercise Name 6 H/L clam  -GR      Cueing 6 Demo  -GR      Sets 6 1  -GR      Reps 6 15  -GR      Additional Comments RTB  -GR              Exercise 7    Exercise Name 7 attempted swiss ball mini wall squat- unable  -GR              Exercise 8    Exercise Name 8 thoaracic extension - swiss ball up wall  -GR      Cueing 8 Demo  -GR      Sets 8 1  -GR      Reps 8 15  -GR            User Key  (r) = Recorded By, (t) = Taken By, (c) = Cosigned By    Initials Name Provider Type    Missael Castaneda, PT Physical Therapist                              PT OP Goals     Row Name 05/18/22 1100          PT Short Term Goals    STG Date to Achieve 05/18/22  -GR     STG 1 Patient will be independent with initail HEP.  -GR     STG 1 Progress Ongoing  -GR     STG 1 Progress Comments  required cueing  -GR     STG 2 Patient will modify sitting position to reduce strain on tailbone and allow optimal healing  -GR     STG 2 Progress Met  -GR     STG 2 Progress Comments is better; still intermittent  -GR     STG 3 Leg pain reported as intermittent vs constant to allow for improved daily functioning.  -GR     STG 3 Progress Ongoing  -GR     STG 3 Progress Comments is worse  -GR            Long Term Goals    LTG Date to Achieve 06/17/22  -GR     LTG 1 Patient will be independent wtih progressive HEP for long term condition management.  -GR     LTG 1 Progress Ongoing  -GR     LTG 2 Patient will resume household chores independently with pain <4/10.  -GR     LTG 2 Progress Ongoing  -GR     LTG 3 Patient will score </= 66% disability on the modified SY to indicate improved perceived ADL performance.  -GR     LTG 3 Progress Ongoing  -GR     LTG 4 Patient resume sit<>stand with minimal UE assist and pain </=3/10.  -GR     LTG 4 Progress Ongoing  -GR     LTG 4 Progress Comments requires maxA from UE  -GR           User Key  (r) = Recorded By, (t) = Taken By, (c) = Cosigned By    Initials Name Provider Type     Missael Deshpande, PT Physical Therapist                Therapy Education  Education Details: [id number] HEP updated              Time Calculation:   Start Time: 1050  Stop Time: 1130  Time Calculation (min): 40 min  Total Timed Code Minutes- PT: 40 minute(s)  Timed Charges  64945 - PT Therapeutic Exercise Minutes: 40  Total Minutes  Timed Charges Total Minutes: 40   Total Minutes: 40  Therapy Charges for Today     Code Description Service Date Service Provider Modifiers Qty    84530197545  PT THER PROC EA 15 MIN 5/18/2022 Missael Deshpande, PT GP 3                    Missael Deshpande, PT  5/18/2022

## 2022-05-18 NOTE — THERAPY PROGRESS REPORT/RE-CERT
Outpatient Physical Therapy Ortho Re-Assessment  [facility]     Patient Name: Mirian Poon  : 1975  MRN: [id number]  Today's Date: 2022      Visit Date: 2022    Patient Active Problem List   Diagnosis   • Chronic obstructive pulmonary disease (HCC)   • Hypertension   • Impingement syndrome of right shoulder   • Posttraumatic stress disorder   • Rotator cuff tear   • Sleep apnea        Past Medical History:   Diagnosis Date   • Allergic    • Anxiety    • Asthma    • COPD (chronic obstructive pulmonary disease) (HCC)    • Depression    • Hypertension    • Low back pain    • Obesity    • Sleep apnea    • Substance abuse (HCC)     in her teens        Past Surgical History:   Procedure Laterality Date   • BREAST LUMPECTOMY     •  SECTION     • SHOULDER LIGAMENT REPAIR         Visit Dx:     ICD-10-CM ICD-9-CM   1. Chronic bilateral low back pain with bilateral sciatica  M54.42 724.2    M54.41 724.3    G89.29 338.29   2. Leg pain, bilateral  M79.604 729.5    M79.605    3. Sacral back pain  M53.3 724.6                             Therapy Education  Education Details: [id number] HEP updated      PT OP Goals     Row Name 22 1100          PT Short Term Goals    STG Date to Achieve 22  -GR     STG 1 Patient will be independent with initail HEP.  -GR     STG 1 Progress Ongoing  -GR     STG 1 Progress Comments required cueing  -GR     STG 2 Patient will modify sitting position to reduce strain on tailbone and allow optimal healing  -GR     STG 2 Progress Met  -GR     STG 2 Progress Comments is better; still intermittent  -GR     STG 3 Leg pain reported as intermittent vs constant to allow for improved daily functioning.  -GR     STG 3 Progress Ongoing  -GR     STG 3 Progress Comments is worse  -GR            Long Term Goals    LTG Date to Achieve 22  -GR     LTG 1 Patient will be independent [facility] progressive HEP for long term condition management.  -GR     LTG 1 Progress Ongoing   -GR     LTG 2 Patient will resume household chores independently with pain <4/10.  -GR     LTG 2 Progress Ongoing  -GR     LTG 3 Patient will score </= 66% disability on the modified SY to indicate improved perceived ADL performance.  -GR     LTG 3 Progress Ongoing  -GR     LTG 4 Patient resume sit<>stand with minimal UE assist and pain </=3/10.  -GR     LTG 4 Progress Ongoing  -GR     LTG 4 Progress Comments requires maxA from UE  -GR           User Key  (r) = Recorded By, (t) = Taken By, (c) = Cosigned By    Initials Name Provider Type    Missael Castaneda, PT Physical Therapist                 PT Assessment/Plan     Row Name 05/18/22 1100          PT Assessment    Assessment Comments Patient returns for 1st follow up 1 month after PT evaluation.  Her leg pain has worsened; she has complied to her HEP partially, did require somecorrection/modification. She has met 1/3 STGs and 0/4 LTGs. Progress is limited by number of completed visits and pain. Will continue trial of PT as able - patient requesting 1x every other week per limited availability.  -GR            PT Plan    PT Plan Comments Assess response to progressions.  -GR           User Key  (r) = Recorded By, (t) = Taken By, (c) = Cosigned By    Initials Name Provider Type    Missael Castaneda, PT Physical Therapist                   OP Exercises     Row Name 05/18/22 1000             Subjective Comments    Subjective Comments Reports has gotten worse over the month. Legs hurt worse - MD gave her another referral for PT, was hoping she might get an MRI. Is even using a can some because she feels her legs are unstable.  -GR              Subjective Pain    Able to rate subjective pain? yes  -GR      Pre-Treatment Pain Level 7  -GR              Total Minutes    76844 - PT Therapeutic Exercise Minutes 40  -GR              Exercise 1    Exercise Name 1 PPT  -GR      Cueing 1 Demo  -GR      Sets 1 1  -GR      Reps 1 15  -GR      Time 1 5 sec  -GR       Additional Comments tolerable range  -GR              Exercise 2    Exercise Name 2 SKTC with towel assist  -GR      Cueing 2 Demo  -GR      Sets 2 1  -GR      Reps 2 3  -GR      Time 2 20 seconds  -GR      Additional Comments each leg  -GR              Exercise 3    Exercise Name 3 LTR tolerable range  -GR      Cueing 3 Demo  -GR      Sets 3 1  -GR      Reps 3 15  -GR              Exercise 4    Exercise Name 4 Nustep UE/LE  -GR      Time 4 5 min  -GR      Additional Comments L2  -GR              Exercise 5    Exercise Name 5 H/L hip adduction  -GR      Cueing 5 Demo  -GR      Sets 5 1  -GR      Reps 5 15  -GR      Time 5 5 sec  -GR      Additional Comments smal blue ball  -GR              Exercise 6    Exercise Name 6 H/L clam  -GR      Cueing 6 Demo  -GR      Sets 6 1  -GR      Reps 6 15  -GR      Additional Comments RTB  -GR              Exercise 7    Exercise Name 7 attempted swiss ball mini wall squat- unable  -GR              Exercise 8    Exercise Name 8 thoaracic extension - swiss ball up wall  -GR      Cueing 8 Demo  -GR      Sets 8 1  -GR      Reps 8 15  -GR            User Key  (r) = Recorded By, (t) = Taken By, (c) = Cosigned By    Initials Name Provider Type    GR Missael Deshpande, PT Physical Therapist                                        Time Calculation:     Start Time: 1050  Stop Time: 1130  Time Calculation (min): 40 min  Total Timed Code Minutes- PT: 40 minute(s)  Timed Charges  88329 - PT Therapeutic Exercise Minutes: 40  Total Minutes  Timed Charges Total Minutes: 40   Total Minutes: 40     Therapy Charges for Today     Code Description Service Date Service Provider Modifiers Qty    14221346566 HC PT THER PROC EA 15 MIN 5/18/2022 Missael Deshpande, PT GP 3                    Missael Deshpande, PT  5/18/2022

## 2022-05-31 ENCOUNTER — HOSPITAL ENCOUNTER (OUTPATIENT)
Dept: PHYSICAL THERAPY | Facility: HOSPITAL | Age: 47
Setting detail: THERAPIES SERIES
Discharge: HOME | End: 2022-05-31

## 2022-05-31 DIAGNOSIS — G89.29 CHRONIC BILATERAL LOW BACK PAIN WITH BILATERAL SCIATICA: Primary | ICD-10-CM

## 2022-05-31 DIAGNOSIS — M54.41 CHRONIC BILATERAL LOW BACK PAIN WITH BILATERAL SCIATICA: Primary | ICD-10-CM

## 2022-05-31 DIAGNOSIS — M53.3 SACRAL BACK PAIN: ICD-10-CM

## 2022-05-31 DIAGNOSIS — M79.605 LEG PAIN, BILATERAL: ICD-10-CM

## 2022-05-31 DIAGNOSIS — M79.604 LEG PAIN, BILATERAL: ICD-10-CM

## 2022-05-31 DIAGNOSIS — M54.42 CHRONIC BILATERAL LOW BACK PAIN WITH BILATERAL SCIATICA: Primary | ICD-10-CM

## 2022-05-31 PROCEDURE — 97110 THERAPEUTIC EXERCISES: CPT | Performed by: PHYSICAL THERAPIST

## 2022-10-18 DIAGNOSIS — M54.41 CHRONIC BILATERAL LOW BACK PAIN WITH BILATERAL SCIATICA: ICD-10-CM

## 2022-10-18 DIAGNOSIS — M54.42 CHRONIC BILATERAL LOW BACK PAIN WITH BILATERAL SCIATICA: ICD-10-CM

## 2022-10-18 DIAGNOSIS — G89.29 CHRONIC BILATERAL LOW BACK PAIN WITH BILATERAL SCIATICA: ICD-10-CM

## 2022-10-19 RX ORDER — MELOXICAM 15 MG/1
TABLET ORAL
Qty: 90 TABLET | Refills: 0 | Status: SHIPPED | OUTPATIENT
Start: 2022-10-19

## 2022-10-19 NOTE — TELEPHONE ENCOUNTER
Last OV: 5/16/2022    Next OV: not scheduled  (not overdue till 11/30/2022)    Last Refill: 3/28/2022

## 2022-11-24 ENCOUNTER — APPOINTMENT (OUTPATIENT)
Dept: ULTRASOUND IMAGING | Facility: HOSPITAL | Age: 47
End: 2022-11-24

## 2022-11-24 ENCOUNTER — HOSPITAL ENCOUNTER (EMERGENCY)
Facility: HOSPITAL | Age: 47
Discharge: HOME | End: 2022-11-24
Attending: EMERGENCY MEDICINE | Admitting: EMERGENCY MEDICINE

## 2022-11-24 VITALS
TEMPERATURE: 97.4 F | BODY MASS INDEX: 44.41 KG/M2 | RESPIRATION RATE: 20 BRPM | OXYGEN SATURATION: 98 % | DIASTOLIC BLOOD PRESSURE: 107 MMHG | HEIGHT: 68 IN | SYSTOLIC BLOOD PRESSURE: 158 MMHG | HEART RATE: 72 BPM | WEIGHT: 293 LBS

## 2022-11-24 DIAGNOSIS — M79.604 ACUTE PAIN OF RIGHT LOWER EXTREMITY: Primary | ICD-10-CM

## 2022-11-24 PROCEDURE — 99283 EMERGENCY DEPT VISIT LOW MDM: CPT

## 2022-11-24 PROCEDURE — 93971 EXTREMITY STUDY: CPT

## 2022-11-24 RX ORDER — METHOCARBAMOL 750 MG/1
750 TABLET, FILM COATED ORAL 3 TIMES DAILY PRN
Qty: 15 TABLET | Refills: 0 | Status: SHIPPED | OUTPATIENT
Start: 2022-11-24 | End: 2022-11-29

## 2022-11-24 RX ORDER — METHOCARBAMOL 500 MG/1
750 TABLET, FILM COATED ORAL 4 TIMES DAILY
Status: DISCONTINUED | OUTPATIENT
Start: 2022-11-24 | End: 2022-11-24 | Stop reason: HOSPADM

## 2022-11-24 RX ORDER — ROSUVASTATIN CALCIUM 20 MG/1
TABLET, COATED ORAL
COMMUNITY
Start: 2022-09-28

## 2022-11-24 RX ORDER — ASPIRIN 81 MG/1
81 TABLET, CHEWABLE ORAL DAILY
COMMUNITY

## 2022-11-24 RX ADMIN — METHOCARBAMOL 750 MG: 500 TABLET ORAL at 16:18

## 2022-11-24 NOTE — ED PROVIDER NOTES
EMERGENCY DEPARTMENT ENCOUNTER      Room Number: 11/11    History is provided by the patient, no translation services needed    HPI:    Chief complaint: Leg pain    Location: Entire right lower extremity    Quality/Severity:  Patient describes the pain as a constant cramping and throbbing pain that she rates a 9/10.    Timing/Duration: Since last night    Modifying Factors: Moving the leg makes the pain worse    Associated Symptoms: None    Narrative: Pt is a 47 y.o. female who presents complaining of pain in her entire right lower extremity since last night. She describes the pain as a constant cramping and throbbing pain. She denies any known injury. Patient states that she may have walked more than usual last night. She admits to chronic lower extremity pain but states that this pain is much different. She denies any redness, swelling, fevers, chills, or other symptoms.       PMD: Silvina Parnell APRN    REVIEW OF SYSTEMS  Review of Systems   Constitutional: Negative for chills and fever.   Eyes: Negative for visual disturbance.   Respiratory: Negative for cough and shortness of breath.    Cardiovascular: Negative for chest pain and leg swelling.   Gastrointestinal: Negative for abdominal pain, diarrhea, nausea and vomiting.   Musculoskeletal: Positive for gait problem (Due to RLE pain). Negative for back pain, joint swelling and neck pain.   Skin: Negative for color change, pallor, rash and wound.   Neurological: Negative for dizziness, syncope, weakness, numbness and headaches.   Psychiatric/Behavioral: Negative for confusion. The patient is not nervous/anxious.          PAST MEDICAL HISTORY  Active Ambulatory Problems     Diagnosis Date Noted   • Chronic obstructive pulmonary disease (HCC) 11/29/2021   • Hypertension 11/29/2021   • Impingement syndrome of right shoulder 03/04/2013   • Posttraumatic stress disorder 11/29/2021   • Rotator cuff tear 03/25/2013   • Sleep apnea 11/29/2021     Resolved  Ambulatory Problems     Diagnosis Date Noted   • No Resolved Ambulatory Problems     Past Medical History:   Diagnosis Date   • Allergic    • Anxiety    • Asthma    • COPD (chronic obstructive pulmonary disease) (HCC)    • Depression    • Hyperlipidemia    • Low back pain    • Obesity    • Substance abuse (HCC)        PAST SURGICAL HISTORY  Past Surgical History:   Procedure Laterality Date   • BREAST LUMPECTOMY     •  SECTION     • SHOULDER LIGAMENT REPAIR         FAMILY HISTORY  Family History   Problem Relation Age of Onset   • Clotting disorder Mother    • Thyroid disease Mother    • Stroke Father    • Hypertension Father    • Liver disease Sister    • Cancer Maternal Grandmother         ovarian   • Liver disease Sister        SOCIAL HISTORY  Social History     Socioeconomic History   • Marital status:    Tobacco Use   • Smoking status: Former     Packs/day: 1.00     Years: 23.00     Pack years: 23.00     Types: Cigarettes     Quit date: 2021     Years since quittin.4   • Smokeless tobacco: Never   Substance and Sexual Activity   • Alcohol use: Yes     Alcohol/week: 5.0 standard drinks     Types: 2 Cans of beer, 3 Shots of liquor per week   • Drug use: Yes     Types: Marijuana     Comment: Weekly eatable   • Sexual activity: Defer     Partners: Female     Birth control/protection: None       ALLERGIES  Patient has no known allergies.      Current Facility-Administered Medications:   •  methocarbamol (ROBAXIN) tablet 750 mg, 750 mg, Oral, 4x Daily, Irma Kidd PA-C    Current Outpatient Medications:   •  acetaminophen (TYLENOL) 500 MG tablet, Take 1 tablet by mouth 3 (Three) Times a Day As Needed., Disp: , Rfl:   •  albuterol (PROVENTIL) (5 MG/ML) 0.5% nebulizer solution, Take 2.5 mg by nebulization Every 6 (Six) Hours As Needed for Wheezing., Disp: , Rfl:   •  budesonide (PULMICORT) 90 MCG/ACT inhaler, Inhale 1 puff 2 (Two) Times a Day., Disp: , Rfl:   •  carvedilol (COREG) 6.25 MG  tablet, Take 6.25 mg by mouth 2 (Two) Times a Day With Meals., Disp: , Rfl:   •  DULOXETINE HCL PO, Take 30 mg by mouth Daily., Disp: , Rfl:   •  furosemide (LASIX) 40 MG tablet, , Disp: , Rfl:   •  ipratropium-albuterol (DUO-NEB) 0.5-2.5 mg/3 ml nebulizer, Take 3 mL by nebulization Every 6 (Six) Hours As Needed., Disp: , Rfl:   •  meloxicam (MOBIC) 15 MG tablet, TAKE ONE TABLET BY MOUTH DAILY, Disp: 90 tablet, Rfl: 0  •  montelukast (SINGULAIR) 10 MG tablet, Take 1 tablet by mouth Every Night., Disp: , Rfl:   •  potassium chloride (MICRO-K) 10 MEQ CR capsule, Take 10 mEq by mouth 2 (Two) Times a Day., Disp: , Rfl:   •  ProAir  (90 Base) MCG/ACT inhaler, Inhale 1 puff Every 6 (Six) Hours As Needed., Disp: , Rfl:   •  rosuvastatin (CRESTOR) 20 MG tablet, , Disp: , Rfl:   •  umeclidinium-vilanterol (Anoro Ellipta) 62.5-25 MCG/INH aerosol powder  inhaler, Inhale 1 application Daily., Disp: , Rfl:   •  aspirin 81 MG chewable tablet, Chew 81 mg Daily., Disp: , Rfl:   •  hydroCHLOROthiazide (HYDRODIURIL) 25 MG tablet, Take 25 mg by mouth Daily., Disp: , Rfl:   •  methocarbamol (ROBAXIN) 750 MG tablet, Take 1 tablet by mouth 3 (Three) Times a Day As Needed for Muscle Spasms for up to 5 days., Disp: 15 tablet, Rfl: 0    PHYSICAL EXAM  ED Triage Vitals [11/24/22 1412]   Temp Heart Rate Resp BP SpO2   97.4 °F (36.3 °C) 72 20 (!) 158/107 98 %      Temp src Heart Rate Source Patient Position BP Location FiO2 (%)   Oral Monitor Sitting Right arm --       Physical Exam  Vitals and nursing note reviewed.   Constitutional:       General: She is not in acute distress.     Appearance: Normal appearance. She is not ill-appearing, toxic-appearing or diaphoretic.   HENT:      Head: Normocephalic and atraumatic.   Eyes:      Extraocular Movements: Extraocular movements intact.      Conjunctiva/sclera: Conjunctivae normal.      Pupils: Pupils are equal, round, and reactive to light.   Cardiovascular:      Rate and Rhythm: Normal  rate and regular rhythm.      Pulses: Normal pulses.      Heart sounds: Normal heart sounds.   Pulmonary:      Effort: Pulmonary effort is normal. No respiratory distress.      Breath sounds: Normal breath sounds. No stridor. No wheezing, rhonchi or rales.   Chest:      Chest wall: No tenderness.   Abdominal:      General: Bowel sounds are normal. There is no distension.      Palpations: Abdomen is soft. There is no mass.      Tenderness: There is no abdominal tenderness. There is no guarding or rebound.   Musculoskeletal:         General: Tenderness (Entire RLE) present. No swelling, deformity or signs of injury. Normal range of motion.      Cervical back: Normal range of motion and neck supple.      Right lower leg: No edema.      Left lower leg: No edema.   Skin:     General: Skin is warm and dry.      Coloration: Skin is not jaundiced or pale.      Findings: No bruising, erythema, lesion or rash.   Neurological:      Mental Status: She is alert and oriented to person, place, and time.      Motor: No weakness.      Coordination: Coordination normal.      Gait: Gait abnormal.   Psychiatric:         Mood and Affect: Mood and affect normal.           LAB RESULTS  Lab Results (last 24 hours)     ** No results found for the last 24 hours. **            I ordered the above labs and reviewed the results    RADIOLOGY  US Venous Doppler Lower Extremity Right (duplex)    Result Date: 11/24/2022  US Veins LE Duplex LTD RT HISTORY: Right lower extremity pain and swelling for one day TECHNIQUE:  Real-time ultrasound was performed of the right lower extremity utilizing spectral and color Doppler with compression and augmentation techniques. COMPARISON: None available. FINDINGS: No evidence of a right lower extremity deep vein thrombosis. The common femoral vein through the popliteal vein are widely patent. There is normal compressibility with spontaneous and phasic waveforms. No calf vein thrombus. Greater saphenous vein is  patent.     No deep venous thrombus in the right lower extremity. Signer Name: Ranjit Clinton MD  Signed: 11/24/2022 3:56 PM  Workstation Name: [id number]  [facility]      I ordered the above radiologic testing and reviewed the results    PROCEDURES  Procedures      PROGRESS AND CONSULTS           MEDICAL DECISION MAKING    MDM     My diagnosis for lower extremity pain and injury includes but is not limited to hip fracture, femur fracture, hip dislocation, hip contusion, hip sprain, hip strain, pelvic fracture, ischio-tibial band pain, ischio-tibial band bursitis, knee sprain, patella dislocation, knee dislocation, internal derangement of knee, fractures of the femur, tibia, fibula, ankle, foot and digits, ankle sprain, ankle dislocation, Lisfranc fracture, fracture dislocations of the digits, pulmonary embolism, claudication, peripheral vascular disease, gout, osteoarthritis, rheumatoid arthritis, bursitis, septic joint, poly-rheumatica, polyarthralgia and other inflammatory or infectious disease processes.  DIAGNOSIS  Final diagnoses:   Acute pain of right lower extremity       Latest Documented Vital Signs:  As of 16:08 EST  BP- (!) 158/107 HR- 72 Temp- 97.4 °F (36.3 °C) (Oral) O2 sat- 98%    DISPOSITION  Pt discharged    Discussed pertinent findings with the patient/family.  Patient/Family voiced understanding of need to follow-up for recheck and further testing as needed.  Return to the Emergency Department warnings were given.         Medication List      New Prescriptions    methocarbamol 750 MG tablet  Commonly known as: ROBAXIN  Take 1 tablet by mouth 3 (Three) Times a Day As Needed for Muscle Spasms for up to 5 days.           Where to Get Your Medications      These medications were sent to [facility] [id number] - [address] AT [address] [place] [address] - [phone]  - [phone]  [address] [place] [address], [facility] [address]    Phone: [phone]   · methocarbamol 750 MG  tablet              Follow-up Information     Silvina Parnell APRN. Call today.    Specialty: Nurse Practitioner  Why: to schedule follow up  Contact information:  [address]  [place] [id number] [phone]             Go to  [facility] Emergency Department.    Specialty: Emergency Medicine  Why: If symptoms worsen  Contact information:  [address] [facility] [id number] [phone]                         Dictated utilizing Dragon dictation     Irma Kidd PA-C  11/24/22 5930

## 2022-11-24 NOTE — ED NOTES
Patient reports having right leg pain since last night. Patient took a meloxicam this morning and it has not given her any relief. Patient using cane while ambulating.